# Patient Record
Sex: MALE | Race: WHITE | Employment: FULL TIME | URBAN - METROPOLITAN AREA
[De-identification: names, ages, dates, MRNs, and addresses within clinical notes are randomized per-mention and may not be internally consistent; named-entity substitution may affect disease eponyms.]

---

## 2020-10-21 ENCOUNTER — TRANSFERRED RECORDS (OUTPATIENT)
Dept: HEALTH INFORMATION MANAGEMENT | Facility: CLINIC | Age: 30
End: 2020-10-21

## 2020-10-21 ENCOUNTER — MEDICAL CORRESPONDENCE (OUTPATIENT)
Dept: HEALTH INFORMATION MANAGEMENT | Facility: CLINIC | Age: 30
End: 2020-10-21

## 2020-11-11 ENCOUNTER — OFFICE VISIT (OUTPATIENT)
Dept: UROLOGY | Facility: CLINIC | Age: 30
End: 2020-11-11
Payer: COMMERCIAL

## 2020-11-11 VITALS
HEIGHT: 69 IN | DIASTOLIC BLOOD PRESSURE: 72 MMHG | HEART RATE: 80 BPM | BODY MASS INDEX: 28.14 KG/M2 | WEIGHT: 190 LBS | SYSTOLIC BLOOD PRESSURE: 126 MMHG | OXYGEN SATURATION: 98 %

## 2020-11-11 DIAGNOSIS — A63.0 GENITAL WARTS: Primary | ICD-10-CM

## 2020-11-11 PROCEDURE — 99203 OFFICE O/P NEW LOW 30 MIN: CPT | Performed by: UROLOGY

## 2020-11-11 ASSESSMENT — MIFFLIN-ST. JEOR: SCORE: 1802.21

## 2020-11-11 NOTE — LETTER
11/11/2020       RE: Tavon Green  [address]  [place] [address]     Dear Colleague,    Thank you for referring your patient, Tavon Green, to the [facility] [place] [facility]. Please see a copy of my visit note below.    Urology Consult History and Physical  [facility]  Name: Tavon Green    MRN: [id number]   YOB: 1990       We were asked to see Tavon Green at the request of Dr. Olivas for evaluation and treatment of genital warts.        Chief Complaint:   Genital warts    History is obtained from the patient            History of Present Illness:   Tavon Green is a 30 year old male who is being seen for evaluation of significant genital warts.     Presence of lesions since 2012  Tried some topical ointment with limited success   He notes that these have been steadily growing in size  No issues with urination or involvement of his glans    (4 or >)  Location: Penis and groin  Quality: large genital warts  Severity: severe  Duration: 2012           Past Medical History:   History reviewed. No pertinent past medical history.         Past Surgical History:     Past Surgical History:   Procedure Laterality Date     VASECTOMY              Social History:     Social History     Tobacco Use     Smoking status: Former Smoker     Smokeless tobacco: Never Used   Substance Use Topics     Alcohol use: Not on file       History   Smoking Status     Former Smoker   Smokeless Tobacco     Never Used            Family History:   History reviewed. No pertinent family history.           Allergies:     Allergies   Allergen Reactions     Codeine Nausea and Vomiting            Medications:     No current outpatient medications on file.     No current facility-administered medications for this visit.              Review of Systems:     Skin: negative  Eyes: negative  Ears/Nose/Throat: negative  Respiratory: No shortness of  "breath, dyspnea on exertion, cough, or hemoptysis  Cardiovascular: negative  Gastrointestinal: negative  Genitourinary: as above  Musculoskeletal: negative  Neurologic: negative  Psychiatric: negative  Hematologic/Lymphatic/Immunologic: negative  Endocrine: negative          Physical Exam:     Patient Vitals for the past 24 hrs:   BP Pulse SpO2 Height Weight   11/11/20 1520 126/72 80 98 % 1.753 m (5' 9\") 86.2 kg (190 lb)     Body mass index is 28.06 kg/m .     General: age-appropriate appearing male in NAD  HEENT: Head AT/NC, EOMI, CN Grossly intact  Lungs: no respiratory distress, or pursed lip breathing  Heart: No obvious jugular venous distension present  Back: no bony midline tenderness, no CVAT bilaterally.  Abdomen: soft, non-distended, non-tender. No organomegaly  : circumcised phallus with a 2cm mid shaft lesion, significant condyloma at the base of the penis and surrounding pubis, some involvement of the scrotum, normal testis  Lymph: no palpable inguinal lymphadenopathy.  LE: no edema.   Musculoskeltal: extremities normal, no peripheral edema  Skin: no suspicious lesions or rashes  Neuro: Alert, oriented, speech and mentation normal;  moving all 4 extremities equally.  Psych: affect and mood normal          Data:   All laboratory data reviewed:    UA RESULTS:  No results for input(s): COLOR, APPEARANCE, URINEGLC, URINEBILI, URINEKETONE, SG, UBLD, URINEPH, PROTEIN, UROBILINOGEN, NITRITE, LEUKEST, RBCU, WBCU in the last 09094 hours.   No results found for: PSA   No results found for: CR            Impression and Plan:   Impression:   30 year old man with significant genital warts - condylomas      Plan:   Genital warts  -We discussed that he does have evidence of significant genital warts with a fairly large lesion on his penile shaft as well as significant involvement of the base of his penis, surrounding pubis and upper scrotum  -We discussed that he would be best served by excision of these lesions " which may require skin grafting  -I discussed this with my partner Dr. Bernabe Bryant who specializes in genitourinary reconstructive surgery and we will arrange for a consultation with him for surgical discussion.     Thank you for the kind consultation.    Time spent: 30 minutes of which >50% was spent counseling.    August Desouza MD   Urology  [facility]  [facility] Phone: [phone]  [facility] Phone: [phone]

## 2020-11-11 NOTE — PROGRESS NOTES
Urology Consult History and Physical  [facility]  Name: Tavon Green    MRN: [id number]   YOB: 1990       We were asked to see Tavon Green at the request of Dr. Olivas for evaluation and treatment of genital warts.        Chief Complaint:   Genital warts    History is obtained from the patient            History of Present Illness:   Tavon Green is a 30 year old male who is being seen for evaluation of significant genital warts.     Presence of lesions since 2012  Tried some topical ointment with limited success   He notes that these have been steadily growing in size  No issues with urination or involvement of his glans    (4 or >)  Location: Penis and groin  Quality: large genital warts  Severity: severe  Duration: 2012           Past Medical History:   History reviewed. No pertinent past medical history.         Past Surgical History:     Past Surgical History:   Procedure Laterality Date     VASECTOMY              Social History:     Social History     Tobacco Use     Smoking status: Former Smoker     Smokeless tobacco: Never Used   Substance Use Topics     Alcohol use: Not on file       History   Smoking Status     Former Smoker   Smokeless Tobacco     Never Used            Family History:   History reviewed. No pertinent family history.           Allergies:     Allergies   Allergen Reactions     Codeine Nausea and Vomiting            Medications:     No current outpatient medications on file.     No current facility-administered medications for this visit.              Review of Systems:     Skin: negative  Eyes: negative  Ears/Nose/Throat: negative  Respiratory: No shortness of breath, dyspnea on exertion, cough, or hemoptysis  Cardiovascular: negative  Gastrointestinal: negative  Genitourinary: as above  Musculoskeletal: negative  Neurologic: negative  Psychiatric: negative  Hematologic/Lymphatic/Immunologic: negative  Endocrine: negative          Physical Exam:     Patient Vitals  "for the past 24 hrs:   BP Pulse SpO2 Height Weight   11/11/20 1520 126/72 80 98 % 1.753 m (5' 9\") 86.2 kg (190 lb)     Body mass index is 28.06 kg/m .     General: age-appropriate appearing male in NAD  HEENT: Head AT/NC, EOMI, CN Grossly intact  Lungs: no respiratory distress, or pursed lip breathing  Heart: No obvious jugular venous distension present  Back: no bony midline tenderness, no CVAT bilaterally.  Abdomen: soft, non-distended, non-tender. No organomegaly  : circumcised phallus with a 2cm mid shaft lesion, significant condyloma at the base of the penis and surrounding pubis, some involvement of the scrotum, normal testis  Lymph: no palpable inguinal lymphadenopathy.  LE: no edema.   Musculoskeltal: extremities normal, no peripheral edema  Skin: no suspicious lesions or rashes  Neuro: Alert, oriented, speech and mentation normal;  moving all 4 extremities equally.  Psych: affect and mood normal          Data:   All laboratory data reviewed:    UA RESULTS:  No results for input(s): COLOR, APPEARANCE, URINEGLC, URINEBILI, URINEKETONE, SG, UBLD, URINEPH, PROTEIN, UROBILINOGEN, NITRITE, LEUKEST, RBCU, WBCU in the last 05605 hours.   No results found for: PSA   No results found for: CR            Impression and Plan:   Impression:   30 year old man with significant genital warts - condylomas      Plan:   Genital warts  -We discussed that he does have evidence of significant genital warts with a fairly large lesion on his penile shaft as well as significant involvement of the base of his penis, surrounding pubis and upper scrotum  -We discussed that he would be best served by excision of these lesions which may require skin grafting  -I discussed this with my partner Dr. Bernabe Bryant who specializes in genitourinary reconstructive surgery and we will arrange for a consultation with him for surgical discussion.     Thank you for the kind consultation.    Time spent: 30 minutes of which >50% was spent " counseling.    August Desouza MD   Urology  [facility]  [facility] Phone: [phone]  [facility] Phone: [phone]

## 2020-11-11 NOTE — NURSING NOTE
Chief Complaint   Patient presents with     Genital Warts     Patient here today to discuss with Dr Desouza about his warts         Ariadne Luz FLAKITA     normal...

## 2020-11-16 ENCOUNTER — TELEPHONE (OUTPATIENT)
Dept: UROLOGY | Facility: CLINIC | Age: 30
End: 2020-11-16

## 2020-11-16 NOTE — TELEPHONE ENCOUNTER
Left message for pt to call and schedule an in clinic visit with Dr. Bryant on 11/18/20 at 10:15am for a consult on genital warts, referred by Dr. Desouza, schedule per Manda.     Spot is being held for pt, please remove hold after scheduling.

## 2020-11-16 NOTE — TELEPHONE ENCOUNTER
----- Message from Manda Evans CMA sent at 11/16/2020 10:20 AM CST -----  Regarding: FW: Genital wart pt  I have the 1015 held  ----- Message -----  From: Bernabe Bryant MD  Sent: 11/16/2020   7:35 AM CST  To: August Desouza MD, Manda vEans CMA, #  Subject: RE: Genital wart pt                              Thanks August  Team-  Can we get this patient a new patient consult for surgery (in person).  Portillo Lucero  ----- Message -----  From: August Desouza MD  Sent: 11/14/2020   5:09 PM CST  To: Bernabe Bryant MD  Subject: Genital wart pt                                  Arash Nath,    Here is the pt with significant genital warts we discussed. Can you have your office reach out to him for scheduling.    Thanks,   August

## 2020-11-17 NOTE — TELEPHONE ENCOUNTER
Second message left attempting to schedule pt. Message included notifying pt that a spot has been held.

## 2020-11-25 ENCOUNTER — TELEPHONE (OUTPATIENT)
Dept: UROLOGY | Facility: CLINIC | Age: 30
End: 2020-11-25

## 2020-11-25 NOTE — TELEPHONE ENCOUNTER
Left second message for pt to call and schedule an in clinic visit with Dr. Bryant to for surgery consult for genital warts. Spot held on 12/16/20 at 8:45AM, or next available.